# Patient Record
Sex: MALE | Race: WHITE | NOT HISPANIC OR LATINO | URBAN - METROPOLITAN AREA
[De-identification: names, ages, dates, MRNs, and addresses within clinical notes are randomized per-mention and may not be internally consistent; named-entity substitution may affect disease eponyms.]

---

## 2022-11-12 ENCOUNTER — EMERGENCY (EMERGENCY)
Facility: HOSPITAL | Age: 27
LOS: 1 days | Discharge: HOME | End: 2022-11-12
Attending: EMERGENCY MEDICINE | Admitting: EMERGENCY MEDICINE

## 2022-11-12 VITALS
SYSTOLIC BLOOD PRESSURE: 135 MMHG | DIASTOLIC BLOOD PRESSURE: 89 MMHG | RESPIRATION RATE: 18 BRPM | OXYGEN SATURATION: 98 % | TEMPERATURE: 98 F | HEART RATE: 98 BPM

## 2022-11-12 PROCEDURE — 99282 EMERGENCY DEPT VISIT SF MDM: CPT

## 2022-11-12 NOTE — ED PROVIDER NOTE - NSICDXPASTMEDICALHX_GEN_ALL_CORE_FT
PAST MEDICAL HISTORY:  No pertinent past medical history [employer] Pregnancy And Lactation Text: This medication is Pregnancy Category B and is considered safe. There is some data to suggest avoiding during the third trimester. It is unknown if this medication is excreted in breast milk.

## 2022-11-12 NOTE — ED ADULT NURSE NOTE - OBJECTIVE STATEMENT
Pt walked in c/o of left thumb pain x months and left elbow pain x weeks. Denies injury/trauma. A&Ox3 speaking in full sentences. NAD

## 2022-11-12 NOTE — ED PROVIDER NOTE - PHYSICAL EXAMINATION
CONSTITUTIONAL: Well appearing, well nourished, awake, alert, oriented to person, place, time/situation and in no apparent distress.  ENT: Airway patent  EYES: Clear bilaterally.  RESPIRATORY: Breathing comfortably with normal RR.  MSK: Range of motion is not limited, no deformities noted.  NEURO: Alert and oriented, no focal deficits.  SKIN: Skin normal color for race, warm, dry and intact. No evidence of rash.  PSYCH: Alert and oriented to person, place, time/situation. normal mood and affect.

## 2022-11-12 NOTE — ED PROVIDER NOTE - NSFOLLOWUPINSTRUCTIONS_ED_ALL_ED_FT
Hand Pain      Many things can cause hand pain. Some common causes are:  •An injury.      •Repeating the same movement with your hand over and over (overuse).      •Osteoporosis.      •Arthritis.      •Lumps in the tendons or joints of the hand and wrist (ganglion cysts).      •Nerve compression syndromes (carpal tunnel syndrome).      •Inflammation of the tendons (tendinitis).      •Infection.        Follow these instructions at home:    Pay attention to any changes in your symptoms. Take these actions to help with your discomfort:      Managing pain, stiffness, and swelling   A bag of ice on a towel on the skin.   •Take over-the-counter and prescription medicines only as told by your health care provider.      •Wear a hand splint or support as told by your health care provider.    •If directed, put ice on the affected area:  •Put ice in a plastic bag.      •Place a towel between your skin and the bag.      •Leave the ice on for 20 minutes, 2–3 times a day.        Activity     •Take breaks from repetitive activity often.      •Avoid activities that make your pain worse.      •Minimize stress on your hands and wrists as much as possible.      •Do stretches or exercises as told by your health care provider.      • Do not do activities that make your pain worse.        Contact a health care provider if:    •Your pain does not get better after a few days of self-care.      •Your pain gets worse.      •Your pain affects your ability to do your daily activities.        Get help right away if:    •Your hand becomes warm, red, or swollen.      •Your hand is numb or tingling.      •Your hand is extremely swollen or deformed.      •Your hand or fingers turn white or blue.      •You cannot move your hand, wrist, or fingers.        Summary    •Many things can cause hand pain.      •Contact your health care provider if your pain does not get better after a few days of self care.      •Minimize stress on your hands and wrists as much as possible.      • Do not do activities that make your pain worse.

## 2022-11-12 NOTE — ED PROVIDER NOTE - OBJECTIVE STATEMENT
28 y/o M with no PMH presents to the ED for L thumb pain and L elbow pain that has been ongoing for several months. Pt states the elbow pain is worse with movement. He tried using topical pain medications without relief. Pt had an XR done a month ago that was negative. He decided to come to the ED today for possible orthopedic evaluation. He denies numbness, tingling, weakness, or any additional injuries.

## 2022-11-12 NOTE — ED ADULT NURSE NOTE - NSFALLRSKASSESSTYPE_ED_ALL_ED
i-STAT Testing  Site venous  Time 1235  Janak's test result N/A  Clinical data room air
Initial (On Arrival)

## 2022-11-12 NOTE — ED PROVIDER NOTE - CLINICAL SUMMARY MEDICAL DECISION MAKING FREE TEXT BOX
28 y/o M presenting with L elbow pain and L thumb pain. On exam, Pt appears well and in no acute distress. Suspect for tendonitis. Pt informed that there is no orthopedic doctor in the ED. Will refer to Dr Gonzalez [hand specialist] for outpatient follow up. No indication for XR imaging at this time. Will prescribe pain medications.

## 2022-11-12 NOTE — ED PROVIDER NOTE - PATIENT PORTAL LINK FT
You can access the FollowMyHealth Patient Portal offered by [facility] by registering at the following website: [email or web address]. By joining [facility]’s FollowMyHealth portal, you will also be able to view your health information using other applications (apps) compatible with our system.

## 2022-11-12 NOTE — ED PROVIDER NOTE - CARE PROVIDER_API CALL
Krish Gonzalez)  Plastic Surgery  [address], [address]  [place] [address]  Phone: [phone]  Fax: [phone]  Follow Up Time:

## 2022-11-12 NOTE — ED ADULT TRIAGE NOTE - CHIEF COMPLAINT QUOTE
pt presents with left elbow and thumb pain for the past several weeks. states that he has had an xray of the thumb, which was negative. worse with movement. denies injury.

## 2022-11-14 DIAGNOSIS — M25.522 PAIN IN LEFT ELBOW: ICD-10-CM

## 2022-11-14 DIAGNOSIS — G89.29 OTHER CHRONIC PAIN: ICD-10-CM

## 2022-11-14 DIAGNOSIS — M79.642 PAIN IN LEFT HAND: ICD-10-CM

## 2022-11-14 DIAGNOSIS — M79.645 PAIN IN LEFT FINGER(S): ICD-10-CM

## 2025-03-07 NOTE — ED ADULT NURSE NOTE - NSHOSCREENINGQ1_ED_ALL_ED
Schedule for TURBT, NO GEMCITABINE. Before patient is scheduled she should be seen by [employer] rep to test interstim battery life. If patient battery has less than 1 year of battery, she should have interstim device replacement in the OR at time of TURBT  Anesthesia Type: General  Assistant needed: No    Preop Clearance? Not Indicated  Is patient taking Anticoagulants? Yes, taking Xarelto. Will need authorization from Dr Walden to hold medication for 3 days. Resume 2-3 days after surgery.      Pacemaker/defibrilator? No, loop recorder    Type and screen? No  Will patient need bowel prep? No  Pre-op medication? Yes, ceftriaxone  Intra-op medication? No    Does the patient need to be off of work and if so how long? Yes, depending if interstim is repleaced  Restrictions include:  see above    Does patient need a follow up appointment after surgery? If yes, appointment will be scheduled day of surgery prior to discharge.yes    Medical Clearance Forms: Medical/Cardiac clearance form sent to Dr Walden  Patient will be called with dates and preoperative instructions.        Encounter routed to Surgery Scheduling.     No

## 2025-03-11 ENCOUNTER — NON-APPOINTMENT (OUTPATIENT)
Age: 30
End: 2025-03-11

## 2025-03-11 ENCOUNTER — APPOINTMENT (OUTPATIENT)
Dept: OTOLARYNGOLOGY | Facility: CLINIC | Age: 30
End: 2025-03-11
Payer: COMMERCIAL

## 2025-03-11 VITALS
DIASTOLIC BLOOD PRESSURE: 81 MMHG | SYSTOLIC BLOOD PRESSURE: 122 MMHG | OXYGEN SATURATION: 97 % | TEMPERATURE: 98 F | HEART RATE: 98 BPM

## 2025-03-11 DIAGNOSIS — J03.90 ACUTE TONSILLITIS, UNSPECIFIED: ICD-10-CM

## 2025-03-11 PROBLEM — Z00.00 ENCOUNTER FOR PREVENTIVE HEALTH EXAMINATION: Status: ACTIVE | Noted: 2025-03-11

## 2025-03-11 PROCEDURE — 99203 OFFICE O/P NEW LOW 30 MIN: CPT

## 2025-05-20 ENCOUNTER — APPOINTMENT (OUTPATIENT)
Dept: OTOLARYNGOLOGY | Facility: CLINIC | Age: 30
End: 2025-05-20
Payer: COMMERCIAL

## 2025-05-20 VITALS
BODY MASS INDEX: 23.94 KG/M2 | SYSTOLIC BLOOD PRESSURE: 141 MMHG | OXYGEN SATURATION: 98 % | WEIGHT: 171 LBS | HEIGHT: 71 IN | HEART RATE: 99 BPM | DIASTOLIC BLOOD PRESSURE: 94 MMHG | TEMPERATURE: 97 F

## 2025-05-20 DIAGNOSIS — J35.1 HYPERTROPHY OF TONSILS: ICD-10-CM

## 2025-05-20 PROCEDURE — 99213 OFFICE O/P EST LOW 20 MIN: CPT

## 2025-06-04 ENCOUNTER — APPOINTMENT (OUTPATIENT)
Dept: OTOLARYNGOLOGY | Facility: CLINIC | Age: 30
End: 2025-06-04
Payer: COMMERCIAL

## 2025-06-04 VITALS
TEMPERATURE: 97.1 F | SYSTOLIC BLOOD PRESSURE: 126 MMHG | BODY MASS INDEX: 24.36 KG/M2 | HEIGHT: 71 IN | OXYGEN SATURATION: 99 % | DIASTOLIC BLOOD PRESSURE: 87 MMHG | HEART RATE: 93 BPM | WEIGHT: 174 LBS

## 2025-06-04 DIAGNOSIS — J03.90 ACUTE TONSILLITIS, UNSPECIFIED: ICD-10-CM

## 2025-06-04 DIAGNOSIS — J35.1 HYPERTROPHY OF TONSILS: ICD-10-CM

## 2025-06-04 PROCEDURE — 99213 OFFICE O/P EST LOW 20 MIN: CPT

## 2025-07-08 ENCOUNTER — NON-APPOINTMENT (OUTPATIENT)
Age: 30
End: 2025-07-08